# Patient Record
(demographics unavailable — no encounter records)

---

## 2024-10-16 NOTE — REVIEW OF SYSTEMS
[Fatigue] : fatigue [Recent Change In Weight] : ~T recent weight change [Redness] : redness [Dryness] : dryness [Vision Problems] : vision problems [Itching] : itching [Diarrhea] : diarrhea [Heartburn] : heartburn [Nocturia] : nocturia [Poor Libido] : poor libido [Joint Pain] : joint pain [Muscle Pain] : muscle pain [Back Pain] : back pain [Negative] : Psychiatric
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
## 2024-10-21 NOTE — END OF VISIT
[] : Resident [FreeTextEntry3] : Phq9 weakly positive, however skewed by fatigue and eating issues. to address these 2 issues and then rescreen. RE GERD sx - to begin famotidine once stool sample for h pylori dropped off at lab.

## 2024-10-21 NOTE — PHYSICAL EXAM
[Normal] : affect was normal and insight and judgment were intact [de-identified] : left eye pterygium

## 2024-10-21 NOTE — PHYSICAL EXAM
[Normal] : affect was normal and insight and judgment were intact [de-identified] : left eye pterygium
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
